# Patient Record
Sex: MALE | Race: WHITE | Employment: FULL TIME | URBAN - METROPOLITAN AREA
[De-identification: names, ages, dates, MRNs, and addresses within clinical notes are randomized per-mention and may not be internally consistent; named-entity substitution may affect disease eponyms.]

---

## 2023-10-22 ENCOUNTER — HOSPITAL ENCOUNTER (EMERGENCY)
Facility: HOSPITAL | Age: 48
Discharge: HOME | End: 2023-10-22
Attending: STUDENT IN AN ORGANIZED HEALTH CARE EDUCATION/TRAINING PROGRAM

## 2023-10-22 ENCOUNTER — APPOINTMENT (OUTPATIENT)
Facility: HOSPITAL | Age: 48
End: 2023-10-22

## 2023-10-22 VITALS
TEMPERATURE: 98.4 F | HEART RATE: 101 BPM | DIASTOLIC BLOOD PRESSURE: 88 MMHG | HEIGHT: 72 IN | WEIGHT: 200 LBS | SYSTOLIC BLOOD PRESSURE: 155 MMHG | BODY MASS INDEX: 27.09 KG/M2 | OXYGEN SATURATION: 99 % | RESPIRATION RATE: 20 BRPM

## 2023-10-22 DIAGNOSIS — S86.012A ACHILLES TENDON TEAR, LEFT, INITIAL ENCOUNTER: Primary | ICD-10-CM

## 2023-10-22 PROCEDURE — 99283 EMERGENCY DEPT VISIT LOW MDM: CPT

## 2023-10-22 PROCEDURE — 29515 APPLICATION SHORT LEG SPLINT: CPT

## 2023-10-22 PROCEDURE — 73610 X-RAY EXAM OF ANKLE: CPT

## 2023-10-22 ASSESSMENT — PAIN - FUNCTIONAL ASSESSMENT: PAIN_FUNCTIONAL_ASSESSMENT: 0-10

## 2023-10-22 ASSESSMENT — LIFESTYLE VARIABLES
HOW OFTEN DO YOU HAVE A DRINK CONTAINING ALCOHOL: MONTHLY OR LESS
HOW MANY STANDARD DRINKS CONTAINING ALCOHOL DO YOU HAVE ON A TYPICAL DAY: 1 OR 2

## 2023-10-22 ASSESSMENT — PAIN DESCRIPTION - DESCRIPTORS: DESCRIPTORS: ACHING

## 2023-10-22 ASSESSMENT — PAIN DESCRIPTION - ORIENTATION: ORIENTATION: LEFT

## 2023-10-22 ASSESSMENT — PAIN DESCRIPTION - LOCATION: LOCATION: LEG

## 2023-10-22 ASSESSMENT — PAIN SCALES - GENERAL: PAINLEVEL_OUTOF10: 5

## 2023-10-23 NOTE — ED TRIAGE NOTES
Pt reports L leg pain, He mentions playing volleyball when he felt like something hit him from behind, pt started seeing stars and felt disoriented then noticed he had severe L leg pain. Pt denies LOC.

## 2023-10-23 NOTE — DISCHARGE INSTRUCTIONS
You were seen today in the emergency department for left ankle pain. Your x-ray showed a small ankle and fusion and a likely partial Achilles tear. You should keep the splint on and use crutches until you follow-up with orthopedics. Call the number provided and schedule an appointment as soon as possible. You should elevate the affected extremity. You can take ibuprofen or Tylenol as needed for pain.

## 2023-10-23 NOTE — ED NOTES
I have reviewed discharge instructions with the patient. The patient verbalized understanding.       Eileen Guerin RN  10/22/23 [id number]

## 2023-10-23 NOTE — ED NOTES
Pt arrives to ED bed with c/o left lower leg/ ankle pain that began while playing volleyball about 1 hour ago. Denies any falls. He is AAO x 4 appears uncomfortable. There is redness/ edema noted with no obvious deformity. + PMS.       Crew, Eileen cao, MORALES  10/22/23 2056

## 2025-08-19 ENCOUNTER — TRANSCRIBE ORDERS (OUTPATIENT)
Facility: HOSPITAL | Age: 50
End: 2025-08-19

## 2025-08-19 DIAGNOSIS — E78.2 MIXED HYPERLIPIDEMIA: ICD-10-CM

## 2025-08-19 DIAGNOSIS — Z13.6 SCREENING FOR ISCHEMIC HEART DISEASE: Primary | ICD-10-CM
